# Patient Record
Sex: FEMALE | Race: WHITE
[De-identification: names, ages, dates, MRNs, and addresses within clinical notes are randomized per-mention and may not be internally consistent; named-entity substitution may affect disease eponyms.]

---

## 2020-02-06 ENCOUNTER — HOSPITAL ENCOUNTER (OUTPATIENT)
Dept: HOSPITAL 59 - SUR | Age: 75
Discharge: HOME | End: 2020-02-06
Attending: OPHTHALMOLOGY
Payer: MEDICARE

## 2020-02-06 DIAGNOSIS — K21.9: ICD-10-CM

## 2020-02-06 DIAGNOSIS — H25.12: Primary | ICD-10-CM

## 2020-02-06 DIAGNOSIS — I10: ICD-10-CM

## 2020-02-06 DIAGNOSIS — J45.909: ICD-10-CM

## 2020-02-07 NOTE — OP NOTE CHAMES
DATE OF PROCEDURE:  02/06/2020



PREOPERATIVE DIAGNOSIS:  Nuclear sclerotic cataract, left eye.



POSTOPERATIVE DIAGNOSIS: Nuclear sclerotic cataract, left eye.



OPERATION:  Phacoemulsification of cataractous lens with implantation of 
intraocular lens.



LENS IMPLANT USED:  Edward & Edward Model PCB00 + 20.0 diopters.



COMPLICATIONS:  None.



PROCEDURE IN DETAIL:  Following a retrobulbar and facial block, the patient was 
prepped and draped in the usual fashion for eye surgery.  A lid speculum was 
placed in the left eye after which a 2.4 mm tunnel wound was placed at the 
temporal limbus and dissected into clear cornea.  A paracentesis was placed at 2
oclock hours to the left and right of the initial incision and the chamber 
deepened with Viscoelastic.  The keratome was then used to enter the anterior 
chamber after which the continuous circular capsulorrhexis was accomplished 
without difficulty using a bent needle and a Utrata forceps.  Hydrodissection 
and hydrodelineation of the lens was performed after which the nucleus of the 
lens was removed using the Phaco handpiece in the divide-and-conquer technique. 
The residual cortical material was irrigated and aspirated from the eye after 
which the bag and chamber were re-examined.  The bag was re-inflated with 
Viscoelastic and the intraocular lens injected into the capsular bag where it 
centered well.  The Viscoelastic was then copiously irrigated and aspirated from
the eye after which the temporal tunnel wound and paracentesis were hydrated and
the wounds were examined.  They were noted to be watertight.   



The lid speculum was removed from the eye and the eye patched and shielded.  The
patient was transferred to the recovery room in satisfactory condition and given
an appointment to be reexamined in the clinic later today or as directed by Dr. Reynolds.



JOB NUMBER:  [id number]

[facility]

## 2020-02-20 ENCOUNTER — HOSPITAL ENCOUNTER (OUTPATIENT)
Dept: HOSPITAL 59 - SUR | Age: 75
Discharge: HOME | End: 2020-02-20
Attending: OPHTHALMOLOGY
Payer: MEDICARE

## 2020-02-20 DIAGNOSIS — K21.9: ICD-10-CM

## 2020-02-20 DIAGNOSIS — H25.11: Primary | ICD-10-CM

## 2020-02-20 DIAGNOSIS — E78.00: ICD-10-CM

## 2020-02-20 DIAGNOSIS — I10: ICD-10-CM

## 2020-02-20 DIAGNOSIS — J45.909: ICD-10-CM

## 2020-02-20 DIAGNOSIS — G47.33: ICD-10-CM

## 2020-02-20 NOTE — OP NOTE CHAMES
DATE OF PROCEDURE:  02/20/2020



PREOPERATIVE DIAGNOSIS:  Nuclear sclerotic cataract, right eye.



POSTOPERATIVE DIAGNOSIS: Nuclear sclerotic cataract, right eye.



OPERATION:  Phacoemulsification of cataractous lens with implantation of 
intraocular lens.



LENS IMPLANT USED:  Edward & Edward Model PCB00 + 20.0 diopters.



COMPLICATIONS:  None.



PROCEDURE IN DETAIL:  Following a retrobulbar and facial block, the patient was 
prepped and draped in the usual fashion for eye surgery.  A lid speculum was 
placed in the right eye after which a 2.4 mm tunnel wound was placed at the 
temporal limbus and dissected into clear cornea.  A paracentesis was placed at 2
oclock hours to the left and right of the initial incision and the chamber 
deepened with Viscoelastic.  The keratome was then used to enter the anterior 
chamber after which the continuous circular capsulorrhexis was accomplished 
without difficulty using a bent needle and a Utrata forceps.  Hydrodissection 
and hydrodelineation of the lens was performed after which the nucleus of the 
lens was removed using the Phaco handpiece in the divide-and-conquer technique. 
The residual cortical material was irrigated and aspirated from the eye after 
which the bag and chamber were re-examined.  The bag was re-inflated with 
Viscoelastic and the intraocular lens injected into the capsular bag where it 
centered well.  The Viscoelastic was then copiously irrigated and aspirated from
the eye after which the temporal tunnel wound and paracentesis were hydrated and
the wounds were examined.  They were noted to be watertight.   



The lid speculum was removed from the eye and the eye patched and shielded.  The
patient was transferred to the recovery room in satisfactory condition and given
an appointment to be reexamined in the clinic later today or as directed by Dr. Reynolds.



JOB NUMBER:  [id number]

[facility]